# Patient Record
Sex: MALE | Race: WHITE | NOT HISPANIC OR LATINO | Employment: OTHER | URBAN - METROPOLITAN AREA
[De-identification: names, ages, dates, MRNs, and addresses within clinical notes are randomized per-mention and may not be internally consistent; named-entity substitution may affect disease eponyms.]

---

## 2022-05-09 ENCOUNTER — OFFICE VISIT (OUTPATIENT)
Dept: NEUROSURGERY | Facility: CLINIC | Age: 62
End: 2022-05-09

## 2022-05-09 VITALS
HEIGHT: 68 IN | HEART RATE: 92 BPM | SYSTOLIC BLOOD PRESSURE: 152 MMHG | WEIGHT: 280.2 LBS | DIASTOLIC BLOOD PRESSURE: 90 MMHG | RESPIRATION RATE: 20 BRPM | BODY MASS INDEX: 42.47 KG/M2

## 2022-05-09 DIAGNOSIS — M48.062 SPINAL STENOSIS, LUMBAR REGION, WITH NEUROGENIC CLAUDICATION: Primary | ICD-10-CM

## 2022-05-09 PROCEDURE — 99204 OFFICE O/P NEW MOD 45 MIN: CPT | Performed by: STUDENT IN AN ORGANIZED HEALTH CARE EDUCATION/TRAINING PROGRAM

## 2022-05-09 RX ORDER — BLOOD-GLUCOSE METER
KIT MISCELLANEOUS
COMMUNITY
Start: 2022-04-22

## 2022-05-09 RX ORDER — ISOSORBIDE MONONITRATE 60 MG/1
1 TABLET, EXTENDED RELEASE ORAL DAILY
COMMUNITY
Start: 2022-02-11

## 2022-05-09 RX ORDER — FENOFIBRATE 48 MG/1
1 TABLET, COATED ORAL DAILY
COMMUNITY
Start: 2022-02-11

## 2022-05-09 RX ORDER — ERGOCALCIFEROL 1.25 MG/1
50000 CAPSULE ORAL
COMMUNITY
Start: 2022-02-24

## 2022-05-09 RX ORDER — PANTOPRAZOLE SODIUM 40 MG/1
TABLET, DELAYED RELEASE ORAL
COMMUNITY
Start: 2022-04-22

## 2022-05-09 RX ORDER — ALLOPURINOL 100 MG/1
1 TABLET ORAL DAILY
COMMUNITY
Start: 2022-04-08

## 2022-05-09 RX ORDER — ASPIRIN 81 MG/1
81 TABLET, CHEWABLE ORAL DAILY
COMMUNITY

## 2022-05-09 RX ORDER — BUMETANIDE 1 MG/1
1 TABLET ORAL EVERY 12 HOURS
COMMUNITY
Start: 2022-02-11

## 2022-05-09 RX ORDER — ATORVASTATIN CALCIUM 80 MG/1
80 TABLET, FILM COATED ORAL
COMMUNITY
Start: 2022-05-05

## 2022-05-09 RX ORDER — TAMSULOSIN HYDROCHLORIDE 0.4 MG/1
1 CAPSULE ORAL DAILY
COMMUNITY
Start: 2022-02-11

## 2022-05-09 RX ORDER — CARVEDILOL 25 MG/1
1 TABLET ORAL 2 TIMES DAILY
COMMUNITY
Start: 2022-02-11

## 2022-05-09 RX ORDER — GABAPENTIN 100 MG/1
100 CAPSULE ORAL 3 TIMES DAILY
Qty: 90 CAPSULE | Refills: 0 | Status: SHIPPED | OUTPATIENT
Start: 2022-05-09

## 2022-05-09 RX ORDER — DIPHENHYDRAMINE HCL 25 MG/1
CAPSULE ORAL
COMMUNITY
Start: 2022-03-08

## 2022-05-09 NOTE — PROGRESS NOTES
Patient: Lucien Kline  : 1960    Primary Care Provider: Iker Dang APRN    Requesting Provider: As above      Chief Complaint: Back Pain      History of Present Illness: This is a 62 y.o. male who presents with longstanding lower back pain who has developed worsening symptoms of neurogenic claudication since January.  The patient states he has minimal leg symptoms when he sitting but when he stands and attempts to ambulate he gets significant tingling and pain in the anterior aspect of his legs.  He has trouble ambulating as a result of this and has to sit down.  Once he sits down he has resolution of his symptoms.  He has not had any recent physical therapy or injections in his back.  Patient was recently hospitalized in January with congestive heart failure and has been dealing with chronic kidney disease as a result.  He states his cardiologist at the last appointment that he is made a very good recovery and does not need to see him for another 9 months.  He is scheduled to see his kidney doctor this week.    PMHX  Allergies:  Allergies   Allergen Reactions   • Contrast Dye Swelling   • Clarithromycin Other (See Comments)     Blood in stool    • Tetracyclines & Related Other (See Comments)     Blood in stool     Medications    Current Outpatient Medications:   •  allopurinol (ZYLOPRIM) 100 MG tablet, Take 1 tablet by mouth Daily., Disp: , Rfl:   •  aspirin 81 MG chewable tablet, Chew 81 mg Daily., Disp: , Rfl:   •  atorvastatin (LIPITOR) 80 MG tablet, Take 80 mg by mouth., Disp: , Rfl:   •  bumetanide (BUMEX) 1 MG tablet, Take 1 tablet by mouth Every 12 (Twelve) Hours., Disp: , Rfl:   •  carvedilol (COREG) 25 MG tablet, Take 1 tablet by mouth 2 (Two) Times a Day., Disp: , Rfl:   •  ergocalciferol (ERGOCALCIFEROL) 1.25 MG (07297 UT) capsule, Take 50,000 Units by mouth., Disp: , Rfl:   •  fenofibrate (TRICOR) 48 MG tablet, Take 1 tablet by mouth Daily., Disp: , Rfl:   •  FREESTYLE LITE test  strip, , Disp: , Rfl:   •  isosorbide mononitrate (IMDUR) 60 MG 24 hr tablet, Take 1 tablet by mouth Daily., Disp: , Rfl:   •  pantoprazole (PROTONIX) 40 MG EC tablet, , Disp: , Rfl:   •  Pharbedryl 25 MG capsule, , Disp: , Rfl:   •  tamsulosin (FLOMAX) 0.4 MG capsule 24 hr capsule, Take 1 capsule by mouth Daily., Disp: , Rfl:   Past Medical History:  Past Medical History:   Diagnosis Date   • CHF (congestive heart failure) (HCC)    • Claustrophobia    • Diabetes mellitus (HCC)    • Gout    • H/O bladder infections    • Hypertension    • Kidney disease    • Low back pain    • Stroke (HCC)      Past Surgical History:  History reviewed. No pertinent surgical history.  Social Hx:  Social History     Tobacco Use   • Smoking status: Never Smoker   • Smokeless tobacco: Never Used   Substance Use Topics   • Alcohol use: Not Currently     Family Hx:  History reviewed. No pertinent family history.  Review of Systems:        Review of Systems   Constitutional: Negative for activity change, appetite change, chills, diaphoresis, fatigue, fever and unexpected weight change.   HENT: Negative for congestion, dental problem, drooling, ear discharge, ear pain, facial swelling, hearing loss, mouth sores, nosebleeds, postnasal drip, rhinorrhea, sinus pressure, sinus pain, sneezing, sore throat, tinnitus, trouble swallowing and voice change.    Eyes: Positive for photophobia and visual disturbance. Negative for pain, discharge, redness and itching.   Respiratory: Negative for apnea, cough, choking, chest tightness, shortness of breath, wheezing and stridor.    Cardiovascular: Negative for chest pain, palpitations and leg swelling.   Gastrointestinal: Positive for constipation. Negative for abdominal distention, abdominal pain, anal bleeding, blood in stool, diarrhea, nausea, rectal pain and vomiting.   Endocrine: Negative for cold intolerance, heat intolerance, polydipsia, polyphagia and polyuria.   Genitourinary: Negative for  "decreased urine volume, difficulty urinating, dysuria, enuresis, flank pain, frequency, genital sores, hematuria and urgency.   Musculoskeletal: Positive for arthralgias, back pain, myalgias, neck pain and neck stiffness. Negative for gait problem and joint swelling.   Skin: Negative for color change, pallor, rash and wound.   Allergic/Immunologic: Negative for environmental allergies, food allergies and immunocompromised state.   Neurological: Positive for dizziness, syncope, light-headedness, numbness and headaches. Negative for tremors, seizures, facial asymmetry, speech difficulty and weakness.   Hematological: Negative for adenopathy. Does not bruise/bleed easily.   Psychiatric/Behavioral: Positive for sleep disturbance. Negative for agitation, behavioral problems, confusion, decreased concentration, dysphoric mood, hallucinations, self-injury and suicidal ideas. The patient is not nervous/anxious and is not hyperactive.         Physical Exam:   /90 (BP Location: Right arm, Patient Position: Sitting, Cuff Size: Large Adult)   Pulse 92   Resp 20   Ht 172.7 cm (68\")   Wt 127 kg (280 lb 3.2 oz)   BMI 42.60 kg/m²   Awake, alert and oriented x 3  Speech f/c  Opens eyes spont  Pupils 3 mm rx bilaterally  Extraocular muscles intact bilaterally  Normal sensation to light touch in all 3 distributions of CN V bilaterally  Face symmetric bilaterally  Tongue midline  5/5 in all 4 ext  Normal sensation to light touch in all 4 ext  2+DTR's  No benites's or clonus bilaterally  No pronator drift or dysmetria  Gait not assessed    Diagnostic Studies:  All neurological imaging studies were independently reviewed unless stated otherwise    Assessment/Plan:  This is a 62 y.o. male presenting with worsening neurogenic claudication since January.  In reviewing the patient's lumbar MRI, he has severe stenosis at L4-5 secondary to a narrow canal and disc bulge.  I think that this is the source of the patient's lower " extremity symptoms when he attempts to ambulate.  The patient has not tried any conservative measures.  I am going to start him on physical therapy, gabapentin and send him for epidural injections.  We will plan have the patient return to clinic in 1 month to see how he is doing.  I think the patient would potentially benefit from a laminectomy, but he has several comorbidities that places him at high risk for surgical intervention.  He will need to be cleared by his nephrologist and cardiologist prior to proceeding with surgery.    Diagnoses and all orders for this visit:    1. Spinal stenosis, lumbar region, with neurogenic claudication (Primary)    Other orders  -     MRI outside films; Future        Samuel Alicea MD  05/09/22  11:59 EDT

## 2022-05-09 NOTE — PATIENT INSTRUCTIONS
INSTRUCTIONS FOR GABAPENTIN:      spenser 1 at bed time for 3 days,   then take 1 two times per day,   then take 1 three times per day.